# Patient Record
Sex: MALE | Race: WHITE | NOT HISPANIC OR LATINO | Employment: FULL TIME | URBAN - METROPOLITAN AREA
[De-identification: names, ages, dates, MRNs, and addresses within clinical notes are randomized per-mention and may not be internally consistent; named-entity substitution may affect disease eponyms.]

---

## 2023-12-05 ENCOUNTER — OFFICE VISIT (OUTPATIENT)
Dept: URGENT CARE | Facility: CLINIC | Age: 23
End: 2023-12-05
Payer: COMMERCIAL

## 2023-12-05 ENCOUNTER — APPOINTMENT (OUTPATIENT)
Dept: RADIOLOGY | Facility: IMAGING CENTER | Age: 23
End: 2023-12-05
Attending: NURSE PRACTITIONER
Payer: COMMERCIAL

## 2023-12-05 ENCOUNTER — APPOINTMENT (OUTPATIENT)
Dept: URGENT CARE | Facility: CLINIC | Age: 23
End: 2023-12-05

## 2023-12-05 VITALS
SYSTOLIC BLOOD PRESSURE: 100 MMHG | BODY MASS INDEX: 23.92 KG/M2 | HEART RATE: 88 BPM | RESPIRATION RATE: 16 BRPM | HEIGHT: 62 IN | OXYGEN SATURATION: 97 % | DIASTOLIC BLOOD PRESSURE: 64 MMHG | WEIGHT: 130 LBS | TEMPERATURE: 98.8 F

## 2023-12-05 DIAGNOSIS — M54.6 ACUTE BILATERAL THORACIC BACK PAIN: ICD-10-CM

## 2023-12-05 DIAGNOSIS — R06.02 SHORTNESS OF BREATH: ICD-10-CM

## 2023-12-05 PROCEDURE — 99204 OFFICE O/P NEW MOD 45 MIN: CPT | Mod: 25 | Performed by: NURSE PRACTITIONER

## 2023-12-05 PROCEDURE — 3074F SYST BP LT 130 MM HG: CPT | Performed by: NURSE PRACTITIONER

## 2023-12-05 PROCEDURE — 3078F DIAST BP <80 MM HG: CPT | Performed by: NURSE PRACTITIONER

## 2023-12-05 PROCEDURE — 93000 ELECTROCARDIOGRAM COMPLETE: CPT | Performed by: NURSE PRACTITIONER

## 2023-12-05 PROCEDURE — 71046 X-RAY EXAM CHEST 2 VIEWS: CPT | Mod: TC,FY | Performed by: RADIOLOGY

## 2023-12-05 RX ORDER — METHYLPREDNISOLONE 4 MG/1
TABLET ORAL
Qty: 21 TABLET | Refills: 0 | Status: SHIPPED | OUTPATIENT
Start: 2023-12-05

## 2023-12-05 RX ORDER — CYCLOBENZAPRINE HCL 5 MG
5-10 TABLET ORAL 3 TIMES DAILY PRN
Qty: 30 TABLET | Refills: 0 | Status: SHIPPED | OUTPATIENT
Start: 2023-12-05 | End: 2023-12-12

## 2023-12-05 RX ORDER — KETOROLAC TROMETHAMINE 30 MG/ML
30 INJECTION, SOLUTION INTRAMUSCULAR; INTRAVENOUS ONCE
Status: COMPLETED | OUTPATIENT
Start: 2023-12-05 | End: 2023-12-05

## 2023-12-05 RX ADMIN — KETOROLAC TROMETHAMINE 30 MG: 30 INJECTION, SOLUTION INTRAMUSCULAR; INTRAVENOUS at 14:37

## 2023-12-05 NOTE — LETTER
December 5, 2023    To Whom It May Concern:         This is confirmation that Yeison Casillas attended his scheduled appointment with JOSE JUAN Dos Santos on 12/05/23. Please excuse from work due to back pain through 12/7/23. May return to work full duty with no restrictions 12/8/23.          Sincerely,          JOSEPH Dos Santos.  [phone]

## 2023-12-06 NOTE — PROGRESS NOTES
"Date: 12/05/23     Chief Complaint:    Chief Complaint   Patient presents with    Back Pain     Started yesterday, \" Sharp pains in between shoulder blades with breathing, that goes from my chest into my back. Hard to bend down, and move side to side. Breathing has improved, but still very sore.\"         History of Present Illness: 23 y.o.  male presents to clinic with back pain that started yesterday.  Patient reports that he woke up yesterday with back pain.  He does have a history of scoliosis and intermittent back pain although states this back pain is in a different area.  He states it does feel muscular and sore in nature.  Although he has had some shortness of breath that he states is due to when he breathes in deep it does cause the pain to radiate to his back.  He states with deep breathing the pain does start in his chest and radiate to his back.  Although he states the pain is reproducible with movement and stretching.  He states he is unable to bend fully forward due to the pain.   He states that shortness of breath has improved today.  He denies any history of aneurysms.  He denies any cardiac like chest pain as discussed.  He denies any loss of bowel or bladder complete control saddle anesthesia fevers or body aches. He denies any recent illness or coughing.  He denies any direct trauma to his back.  He states due to the pain he did have to miss work and is requesting a work note.      ROS:    As stated in HPI     Medical/SX/ Social History:  Reviewed per chart    Pertinent Medications:    No current outpatient medications on file prior to visit.     No current facility-administered medications on file prior to visit.        Allergies:    Patient has no known allergies.     Problem list, medications, and allergies reviewed by myself today in Epic     Physical Exam:    Vitals:    12/05/23 1508   BP: 100/64   Pulse:    Resp:    Temp:    SpO2:              Physical Exam  Constitutional:       General: He " is awake.      Appearance: Normal appearance. He is not ill-appearing, toxic-appearing or diaphoretic.   HENT:      Head: Normocephalic and atraumatic.      Right Ear: Tympanic membrane, ear canal and external ear normal.      Left Ear: Tympanic membrane, ear canal and external ear normal.   Eyes:      General: Lids are normal. Gaze aligned appropriately. No allergic shiner or scleral icterus.     Extraocular Movements: Extraocular movements intact.      Conjunctiva/sclera: Conjunctivae normal.      Pupils: Pupils are equal, round, and reactive to light.   Cardiovascular:      Rate and Rhythm: Normal rate and regular rhythm.      Pulses:           Radial pulses are 2+ on the right side and 2+ on the left side.      Heart sounds: Normal heart sounds.   Pulmonary:      Effort: Pulmonary effort is normal.      Breath sounds: Normal breath sounds and air entry. No decreased breath sounds, wheezing, rhonchi or rales.   Abdominal:      General: Abdomen is flat. Bowel sounds are normal.      Palpations: Abdomen is soft. There is no pulsatile mass.      Tenderness: There is no abdominal tenderness.   Musculoskeletal:      Cervical back: Normal.      Thoracic back: Swelling and spasms present. No bony tenderness. Decreased range of motion.      Lumbar back: Normal.        Back:       Right lower leg: No edema.      Left lower leg: No edema.   Lymphadenopathy:      Cervical: No cervical adenopathy.   Skin:     General: Skin is warm.      Capillary Refill: Capillary refill takes less than 2 seconds.      Coloration: Skin is not cyanotic or pale.   Neurological:      Mental Status: He is alert and oriented to person, place, and time.      Gait: Gait is intact.   Psychiatric:         Behavior: Behavior normal. Behavior is cooperative.                  Diagnostics:    EKG: NSR     DX-CHEST-2 VIEWS    Result Date: 12/5/2023 12/5/2023 2:36 PM HISTORY/REASON FOR EXAM:  Shortness of Breath; sob, chest and back pain TECHNIQUE/EXAM  DESCRIPTION: PA and lateral views of the chest. COMPARISON:  None. FINDINGS: The lungs are clear. The cardiac silhouette is normal in size. No effusions or pneumothoraces are present. There are no significant osseous abnormalities. The visualized portions of the upper abdomen are within normal limits.     NEGATIVE TWO VIEWS OF THE CHEST.      Diagnostics interpreted by myself, confirmed by radiology     Medical Decision making and plan :  I personally reviewed prior external notes and test results pertinent to today's visit. Pt is clinically stable at today's acute urgent care visit.  Patient appears nontoxic with no acute distress noted. Appropriate for outpatient care at this time. The patient remained stable during the urgent care visit.     Pleasant 23 y.o. male presented clinic with back pain that is reproducible on exam.  Although due to patient's shortness of breath and reporting pain at that starts in his chest and radiates to his back with deep breathing did entertain a wide array of differentials.  Fortunately bilateral arm blood pressures are within normal limits.  He has no pulsatile mass.  He is not tripoding breathing.  Low suspicion of AAA at this time.  Chest x-ray negative, EKG no ischemic findings.  Patient was given in clinic Toradol and upon discharge he states that the pain did significantly improve.  Patient will be prescribed Medrol Dosepak to be started tomorrow morning.  He will also be prescribed muscle relaxers.  Did advise sedation that can be caused with muscle relaxers.  Work note was provided.  Shared decision-making was utilized with patient for treatment plan.  Differential Diagnosis, natural history, and supportive care discussed.        Administrations This Visit       ketorolac (Toradol) injection 30 mg       Admin Date  12/05/2023 Action  Given Dose  30 mg Route  Intramuscular Administered By  Miky John Ass't                     1. Acute bilateral thoracic back pain    -  ketorolac (Toradol) injection 30 mg  - methylPREDNISolone (MEDROL DOSEPAK) 4 MG Tablet Therapy Pack; Follow schedule on package instructions.  Dispense: 21 Tablet; Refill: 0  - cyclobenzaprine (FLEXERIL) 5 mg tablet; Take 1-2 Tablets by mouth 3 times a day as needed for Muscle Spasms for up to 7 days.  Dispense: 30 Tablet; Refill: 0    2. Shortness of breath    - DX-CHEST-2 VIEWS; Future  - EKG - Clinic Performed        Medication discussed included indication for use and the potential benefits and side effects. Education was provided regarding the aforementioned assessments.  All of the patient's questions were answered to their satisfaction at the time of discharge. Patient was encouraged to monitor symptoms closely. Those signs and symptoms which would warrant concern and mandate seeking a higher level of service through the emergency department discussed at length.  Patient stated agreement and understanding of this plan of care.    Disposition:  Home in stable condition       Voice Recognition Disclaimer:  Portions of this document were created using voice recognition software. The software does have a chance of producing errors of grammar and possibly content. I have made every reasonable attempt to correct obvious errors, but there may be errors of grammar and possibly content that I did not discover before finalizing the documentation.    Debby De Leon, KAN.[email or web address].[email or web address].

## 2024-08-30 ENCOUNTER — APPOINTMENT (OUTPATIENT)
Dept: RADIOLOGY | Facility: IMAGING CENTER | Age: 24
End: 2024-08-30
Attending: REGISTERED NURSE
Payer: COMMERCIAL

## 2024-08-30 ENCOUNTER — OFFICE VISIT (OUTPATIENT)
Dept: URGENT CARE | Facility: CLINIC | Age: 24
End: 2024-08-30
Payer: COMMERCIAL

## 2024-08-30 VITALS
DIASTOLIC BLOOD PRESSURE: 60 MMHG | HEIGHT: 63 IN | RESPIRATION RATE: 16 BRPM | SYSTOLIC BLOOD PRESSURE: 120 MMHG | BODY MASS INDEX: 23.14 KG/M2 | HEART RATE: 69 BPM | WEIGHT: 130.6 LBS | OXYGEN SATURATION: 97 % | TEMPERATURE: 97.8 F
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
## 2024-08-30 DIAGNOSIS — M25.552 PAIN OF LEFT HIP: ICD-10-CM

## 2024-08-30 PROCEDURE — 73501 X-RAY EXAM HIP UNI 1 VIEW: CPT | Mod: TC,FY,LT | Performed by: REGISTERED NURSE

## 2024-08-30 RX ORDER — KETOROLAC TROMETHAMINE 15 MG/ML
15 INJECTION, SOLUTION INTRAMUSCULAR; INTRAVENOUS ONCE
Status: COMPLETED | OUTPATIENT
Start: 2024-08-30 | End: 2024-08-30

## 2024-08-30 RX ADMIN — KETOROLAC TROMETHAMINE 15 MG: 15 INJECTION, SOLUTION INTRAMUSCULAR; INTRAVENOUS at 15:32

## 2024-08-30 NOTE — LETTER
August 30, 2024         Patient: Yeison Casillas   YOB: 2000   Date of Visit: 8/30/2024           To Whom it May Concern:    Yeison Casillas was seen in my clinic on 8/30/2024. He may return to work on 09/04/24.    If you have any questions or concerns, please don't hesitate to call.        Sincerely,           JOSE JUAN Ruiz  Electronically Signed

## 2024-08-30 NOTE — PROGRESS NOTES
"Subjective:   Yeison Casillas is a 24 y.o. male who presents for Fall (X 6 days, hip pain due to a fall and landed on Lt knee, while bowling.)      HPI  6 days ago was bowling and landed on his left knee. Having pain in the left hip and it radiates down towards the knee. Pain is sharp and brief. Has extraneous job and certain activities like picking up objects exacerbates his symptoms.  No prior hip injuries.  No numbness or tingling.  Is able to ambulate.  OTC medications for symptoms provide some relief.    ROS per HPI    No Known Allergies    There are no problems to display for this patient.      Current Outpatient Medications Ordered in Epic   Medication Sig Dispense Refill    methylPREDNISolone (MEDROL DOSEPAK) 4 MG Tablet Therapy Pack Follow schedule on package instructions. (Patient not taking: Reported on 8/30/2024) 21 Tablet 0     Current Facility-Administered Medications Ordered in Epic   Medication Dose Route Frequency Provider Last Rate Last Admin    ketorolac (Toradol) 15 MG/ML injection 15 mg  15 mg Intramuscular Once            No past surgical history on file.    Social History     Tobacco Use    Smoking status: Never    Smokeless tobacco: Never   Vaping Use    Vaping status: Never Used   Substance Use Topics    Alcohol use: Yes     Comment: rare    Drug use: Never       family history is not on file.     Problem list, medications, and allergies reviewed by myself today in Epic.     Objective:   /60   Pulse 69   Temp 36.6 °C (97.8 °F) (Temporal)   Resp 16   Ht 1.6 m (5' 3\")   Wt 59.2 kg (130 lb 9.6 oz)   SpO2 97%   BMI 23.13 kg/m²     Physical Exam  Vitals and nursing note reviewed.   Constitutional:       Appearance: He is not ill-appearing or toxic-appearing.   HENT:      Head: Normocephalic.   Eyes:      Pupils: Pupils are equal, round, and reactive to light.   Cardiovascular:      Rate and Rhythm: Normal rate.   Pulmonary:      Effort: Pulmonary effort is normal.   Musculoskeletal:    "   Left hip: No deformity or crepitus. Normal strength.      Comments: NVID LLE. No skin discoloration. Normal ROM left hip, knee, ankle and foot.   Neurological:      General: No focal deficit present.      Mental Status: He is alert.   Psychiatric:         Mood and Affect: Mood normal.         Assessment/Plan:     I personally reviewed prior external notes and test results pertinent to today's visit as well as additional imaging and testing completed in clinic today.    I introduced myself as Javad Maynard a Nurse Practitioner.    1. Fall, initial encounter  DX-HIP-UNILATERAL-WITH PELVIS-1 VIEW LEFT    ketorolac (Toradol) 15 MG/ML injection 15 mg      2. Pain of left hip  DX-HIP-UNILATERAL-WITH PELVIS-1 VIEW LEFT    ketorolac (Toradol) 15 MG/ML injection 15 mg      Patient was bowling fell landing on left knee has had some pain in the left hip region that radiates down the thigh.  There is no numbness tingling.  No loss of range of motion or function.  No underlying bone abnormalities.  Did complete imaging which was negative for acute osseous abnormality.  Did offer reassurance and discussed likely strain versus inflammation secondary to impact.  Toradol injection given in clinic.  Discussed RICE therapy.  Heat.  Epsom salt baths.  May start NSAIDs tomorrow. Work note given.    Medication discussed included indication for use and the potential benefits and side effects. The Patient was encouraged to monitor symptoms closely, and we reviewed the signs and symptoms that require a higher level of care through the emergency department. Patient verbalized understanding.    Please note that this dictation was created using voice recognition software. I have made every reasonable attempt to correct obvious errors, but I expect that there are errors of grammar and possibly content that I did not discover before finalizing the note.    This note was electronically signed by JOSE JUAN Ruzi